# Patient Record
Sex: MALE | Race: WHITE | NOT HISPANIC OR LATINO | Employment: UNEMPLOYED | URBAN - METROPOLITAN AREA
[De-identification: names, ages, dates, MRNs, and addresses within clinical notes are randomized per-mention and may not be internally consistent; named-entity substitution may affect disease eponyms.]

---

## 2022-05-19 ENCOUNTER — APPOINTMENT (OUTPATIENT)
Dept: GENERAL RADIOLOGY | Facility: HOSPITAL | Age: 27
End: 2022-05-19

## 2022-05-19 ENCOUNTER — HOSPITAL ENCOUNTER (EMERGENCY)
Facility: HOSPITAL | Age: 27
Discharge: HOME | End: 2022-05-19
Attending: EMERGENCY MEDICINE | Admitting: EMERGENCY MEDICINE

## 2022-05-19 VITALS
SYSTOLIC BLOOD PRESSURE: 126 MMHG | HEIGHT: 72 IN | TEMPERATURE: 97.5 F | WEIGHT: 190 LBS | HEART RATE: 77 BPM | OXYGEN SATURATION: 95 % | BODY MASS INDEX: 25.73 KG/M2 | DIASTOLIC BLOOD PRESSURE: 69 MMHG | RESPIRATION RATE: 18 BRPM

## 2022-05-19 DIAGNOSIS — R07.89 LEFT-SIDED CHEST WALL PAIN: Primary | ICD-10-CM

## 2022-05-19 DIAGNOSIS — J02.0 ACUTE STREPTOCOCCAL PHARYNGITIS: ICD-10-CM

## 2022-05-19 LAB
ALBUMIN SERPL-MCNC: 4.6 G/DL (ref 3.5–5.2)
ALBUMIN/GLOB SERPL: 1.6 G/DL
ALP SERPL-CCNC: 66 U/L (ref 39–117)
ALT SERPL W P-5'-P-CCNC: 36 U/L (ref 1–41)
ANION GAP SERPL CALCULATED.3IONS-SCNC: 14.4 MMOL/L (ref 5–15)
AST SERPL-CCNC: 26 U/L (ref 1–40)
BASOPHILS # BLD AUTO: 0.06 10*3/MM3 (ref 0–0.2)
BASOPHILS NFR BLD AUTO: 0.4 % (ref 0–1.5)
BILIRUB SERPL-MCNC: 0.7 MG/DL (ref 0–1.2)
BUN SERPL-MCNC: 12 MG/DL (ref 6–20)
BUN/CREAT SERPL: 11.4 (ref 7–25)
CALCIUM SPEC-SCNC: 9.6 MG/DL (ref 8.6–10.5)
CHLORIDE SERPL-SCNC: 102 MMOL/L (ref 98–107)
CO2 SERPL-SCNC: 22.6 MMOL/L (ref 22–29)
CREAT SERPL-MCNC: 1.05 MG/DL (ref 0.76–1.27)
DEPRECATED RDW RBC AUTO: 41 FL (ref 37–54)
EGFRCR SERPLBLD CKD-EPI 2021: 101.7 ML/MIN/1.73
EOSINOPHIL # BLD AUTO: 0.21 10*3/MM3 (ref 0–0.4)
EOSINOPHIL NFR BLD AUTO: 1.5 % (ref 0.3–6.2)
ERYTHROCYTE [DISTWIDTH] IN BLOOD BY AUTOMATED COUNT: 12.9 % (ref 12.3–15.4)
GLOBULIN UR ELPH-MCNC: 2.8 GM/DL
GLUCOSE SERPL-MCNC: 89 MG/DL (ref 65–99)
HCT VFR BLD AUTO: 45.8 % (ref 37.5–51)
HGB BLD-MCNC: 15.7 G/DL (ref 13–17.7)
IMM GRANULOCYTES # BLD AUTO: 0.05 10*3/MM3 (ref 0–0.05)
IMM GRANULOCYTES NFR BLD AUTO: 0.4 % (ref 0–0.5)
LYMPHOCYTES # BLD AUTO: 3.02 10*3/MM3 (ref 0.7–3.1)
LYMPHOCYTES NFR BLD AUTO: 22 % (ref 19.6–45.3)
MCH RBC QN AUTO: 30.1 PG (ref 26.6–33)
MCHC RBC AUTO-ENTMCNC: 34.3 G/DL (ref 31.5–35.7)
MCV RBC AUTO: 87.9 FL (ref 79–97)
MONOCYTES # BLD AUTO: 1.19 10*3/MM3 (ref 0.1–0.9)
MONOCYTES NFR BLD AUTO: 8.7 % (ref 5–12)
NEUTROPHILS NFR BLD AUTO: 67 % (ref 42.7–76)
NEUTROPHILS NFR BLD AUTO: 9.2 10*3/MM3 (ref 1.7–7)
NRBC BLD AUTO-RTO: 0 /100 WBC (ref 0–0.2)
PLATELET # BLD AUTO: 253 10*3/MM3 (ref 140–450)
PMV BLD AUTO: 9.4 FL (ref 6–12)
POTASSIUM SERPL-SCNC: 4.2 MMOL/L (ref 3.5–5.2)
PROT SERPL-MCNC: 7.4 G/DL (ref 6–8.5)
RBC # BLD AUTO: 5.21 10*6/MM3 (ref 4.14–5.8)
S PYO AG THROAT QL: POSITIVE
SODIUM SERPL-SCNC: 139 MMOL/L (ref 136–145)
TROPONIN T SERPL-MCNC: <0.01 NG/ML (ref 0–0.03)
WBC NRBC COR # BLD: 13.73 10*3/MM3 (ref 3.4–10.8)

## 2022-05-19 PROCEDURE — 93010 ELECTROCARDIOGRAM REPORT: CPT | Performed by: INTERNAL MEDICINE

## 2022-05-19 PROCEDURE — 71045 X-RAY EXAM CHEST 1 VIEW: CPT

## 2022-05-19 PROCEDURE — 36415 COLL VENOUS BLD VENIPUNCTURE: CPT

## 2022-05-19 PROCEDURE — 80053 COMPREHEN METABOLIC PANEL: CPT | Performed by: PHYSICIAN ASSISTANT

## 2022-05-19 PROCEDURE — 87880 STREP A ASSAY W/OPTIC: CPT | Performed by: PHYSICIAN ASSISTANT

## 2022-05-19 PROCEDURE — 93005 ELECTROCARDIOGRAM TRACING: CPT

## 2022-05-19 PROCEDURE — 84484 ASSAY OF TROPONIN QUANT: CPT | Performed by: PHYSICIAN ASSISTANT

## 2022-05-19 PROCEDURE — 93005 ELECTROCARDIOGRAM TRACING: CPT | Performed by: EMERGENCY MEDICINE

## 2022-05-19 PROCEDURE — 99283 EMERGENCY DEPT VISIT LOW MDM: CPT

## 2022-05-19 PROCEDURE — 85025 COMPLETE CBC W/AUTO DIFF WBC: CPT | Performed by: PHYSICIAN ASSISTANT

## 2022-05-19 RX ORDER — AMOXICILLIN 875 MG/1
875 TABLET, COATED ORAL 2 TIMES DAILY
Qty: 20 TABLET | Refills: 0 | Status: SHIPPED | OUTPATIENT
Start: 2022-05-19 | End: 2022-05-29

## 2022-05-19 NOTE — ED PROVIDER NOTES
MD ATTESTATION NOTE    The MANASA and I have discussed this patient's history, physical exam, and treatment plan.  I have reviewed the documentation and personally had a face to face interaction with the patient. I affirm the documentation and agree with the treatment and plan.  The attached note describes my personal findings.      I provided a substantive portion of the care of the patient.  I personally performed the physical exam in its entirety, and below are my findings.  For this patient encounter, the patient wore surgical mask, I wore full protective PPE including N95 and eye protection    Brief HPI: 26-year-old male who presents to the ER for left-sided chest pain that radiates to his shoulder after starting regimen of push-ups and playing golf.  Patient states the pain is worse with movement and palpation.  The patient also complains of a sore throat this morning.  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    General : 26-year-old male patient is awake alert and oriented  HEENT: NCAT: The patient does have posterior oropharyngeal erythema and tonsillar exudates.  CV: Heart is regular with no murmurs: Left upper chest wall tenderness to palpation  Respiratory: CTA bilaterally  Abd: Soft and nontender  Ext: No acute abnormalities  Skin: No rash  Neuro: Cranial nerves II through XII grossly intact as tested.  No acute lateralizing deficits.  Psych: Normal mood and affect      Plan: Obtain EKG, labs, chest x-ray and strep swab    The patient EKG was unremarkable as was his troponin.  His chest x-ray is negative.  However his strep screen is positive.  I believe the patient is stable for discharge home on amoxicillin for his strep throat and can treat his chest wall pain symptomatically with anti-inflammatories.     Dominic Finley MD  05/19/22 1936

## 2022-05-19 NOTE — ED NOTES
Left sided CP with radiation to left shoulder that started last night. Denies cardiac hx. No nausea, vomiting or dizziness.

## 2022-05-19 NOTE — ED PROVIDER NOTES
EMERGENCY DEPARTMENT ENCOUNTER    Room Number:  35/35  Date seen:  5/19/2022  Time seen: 13:11 EDT  PCP: Provider, No Known  Historian: patient      HPI:  Chief Complaint: chest pain    A complete HPI/ROS/PMH/PSH/SH/FH are unobtainable due to: none    Context: Emiliano Moncada is a 26 y.o. male who presents to the ED for evaluation of 5 out of 10 left-sided chest tightness that radiates into his left shoulder as a tightness that began last night.  He does not remember what he was doing when it started but does not believe he was doing any significant activity.  Pain is constant since, worsens when he moves his left arm, it is not exertional or pleuritic.  No history of the symptoms.  He denies any significant medical history, no history of heart lung or kidney disease.  No family history of CAD.  Denies any cocaine use.  No intravenous drug use.  Denies any shortness of breath diaphoresis lightheadedness or passing out.  He also reports awaking with a sore throat this morning.  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  He denies any cough congestion fever chills or other upper respiratory symptoms.  His significant other's mother has had 2 heart attacks and was very concerned about his symptoms, prompted his ER visit.        PAST MEDICAL HISTORY  Active Ambulatory Problems     Diagnosis Date Noted   • No Active Ambulatory Problems     Resolved Ambulatory Problems     Diagnosis Date Noted   • No Resolved Ambulatory Problems     No Additional Past Medical History         PAST SURGICAL HISTORY  No past surgical history on file.      FAMILY HISTORY  Family History   Problem Relation Age of Onset   • No Known Problems Mother    • No Known Problems Father          SOCIAL HISTORY  Social History     Socioeconomic History   • Marital status: Single   Tobacco Use   • Smoking status: Never Smoker   • Smokeless tobacco: Never Used   Substance and Sexual Activity   •  Alcohol use: Not Currently         ALLERGIES  Patient has no known allergies.        REVIEW OF SYSTEMS  Review of Systems     All systems reviewed and negative except for those discussed in HPI.       PHYSICAL EXAM  ED Triage Vitals [05/19/22 1250]   Temp Heart Rate Resp BP SpO2   97.5 °F (36.4 °C) 110 16 -- 96 %      Temp src Heart Rate Source Patient Position BP Location FiO2 (%)   -- -- -- -- --         GENERAL: not distressed  HENT: atraumatic, normocephalic.  Oropharynx is moist, there is posterior oropharyngeal erythema and some bilateral tonsillar exudate is present.  No trismus, uvula is midline  EYES: no scleral icterus, PERRL  CV: regular rhythm, regular rate  RESPIRATORY: normal effort, CTA B, no chest wall tenderness  ABDOMEN: soft, nontender nondistended  MUSCULOSKELETAL: no deformity  NEURO: alert, moves all extremities, follows commands  SKIN: warm, dry    Vital signs and nursing notes reviewed.          LAB RESULTS  Recent Results (from the past 24 hour(s))   ECG 12 Lead    Collection Time: 05/19/22  1:04 PM   Result Value Ref Range    QT Interval 372 ms   Rapid Strep A Screen - Swab, Throat    Collection Time: 05/19/22  2:00 PM    Specimen: Throat; Swab   Result Value Ref Range    Strep A Ag Positive (A) Negative   Comprehensive Metabolic Panel    Collection Time: 05/19/22  2:02 PM    Specimen: Blood   Result Value Ref Range    Glucose 89 65 - 99 mg/dL    BUN 12 6 - 20 mg/dL    Creatinine 1.05 0.76 - 1.27 mg/dL    Sodium 139 136 - 145 mmol/L    Potassium 4.2 3.5 - 5.2 mmol/L    Chloride 102 98 - 107 mmol/L    CO2 22.6 22.0 - 29.0 mmol/L    Calcium 9.6 8.6 - 10.5 mg/dL    Total Protein 7.4 6.0 - 8.5 g/dL    Albumin 4.60 3.50 - 5.20 g/dL    ALT (SGPT) 36 1 - 41 U/L    AST (SGOT) 26 1 - 40 U/L    Alkaline Phosphatase 66 39 - 117 U/L    Total Bilirubin 0.7 0.0 - 1.2 mg/dL    Globulin 2.8 gm/dL    A/G Ratio 1.6 g/dL    BUN/Creatinine Ratio 11.4 7.0 - 25.0    Anion Gap 14.4 5.0 - 15.0 mmol/L    eGFR 101.7  >60.0 mL/min/1.73   Troponin    Collection Time: 05/19/22  2:02 PM    Specimen: Blood   Result Value Ref Range    Troponin T <0.010 0.000 - 0.030 ng/mL   CBC Auto Differential    Collection Time: 05/19/22  2:02 PM    Specimen: Blood   Result Value Ref Range    WBC 13.73 (H) 3.40 - 10.80 10*3/mm3    RBC 5.21 4.14 - 5.80 10*6/mm3    Hemoglobin 15.7 13.0 - 17.7 g/dL    Hematocrit 45.8 37.5 - 51.0 %    MCV 87.9 79.0 - 97.0 fL    MCH 30.1 26.6 - 33.0 pg    MCHC 34.3 31.5 - 35.7 g/dL    RDW 12.9 12.3 - 15.4 %    RDW-SD 41.0 37.0 - 54.0 fl    MPV 9.4 6.0 - 12.0 fL    Platelets 253 140 - 450 10*3/mm3    Neutrophil % 67.0 42.7 - 76.0 %    Lymphocyte % 22.0 19.6 - 45.3 %    Monocyte % 8.7 5.0 - 12.0 %    Eosinophil % 1.5 0.3 - 6.2 %    Basophil % 0.4 0.0 - 1.5 %    Immature Grans % 0.4 0.0 - 0.5 %    Neutrophils, Absolute 9.20 (H) 1.70 - 7.00 10*3/mm3    Lymphocytes, Absolute 3.02 0.70 - 3.10 10*3/mm3    Monocytes, Absolute 1.19 (H) 0.10 - 0.90 10*3/mm3    Eosinophils, Absolute 0.21 0.00 - 0.40 10*3/mm3    Basophils, Absolute 0.06 0.00 - 0.20 10*3/mm3    Immature Grans, Absolute 0.05 0.00 - 0.05 10*3/mm3    nRBC 0.0 0.0 - 0.2 /100 WBC       Ordered the above labs and independently reviewed the results.        RADIOLOGY  XR Chest 1 View   Final Result   No evidence for acute pulmonary process. Follow-up as   clinical indications persist.       This report was finalized on 5/19/2022 1:56 PM by Dr. Monster Sauer M.D.              I ordered the above noted radiological studies. Reviewed by me and discussed with radiologist.  See dictation for official radiology interpretation.    PROCEDURES  Procedures        MEDICATIONS GIVEN IN ER  Medications - No data to display          PROGRESS AND CONSULTS    DDx includes but is not limited to acute coronary syndrome, pulmonary embolism, thoracic aortic dissection, pneumonia, pneumothorax, musculoskeletal pain, GERD or esophageal spasm, anxiety, myocarditis/pericarditis, esophageal  rupture, pancreatitis.     History: 0  EC  Age: 0  Risk factors: 0    HEART score - 0        ED Course as of 22   Thu May 19, 2022   1437 WBC(!): 13.73 [KA]   1437 Hemoglobin: 15.7 [KA]   1459 Strep A Ag(!): Positive [KA]   1535 Glucose: 89 [KA]   1535 Creatinine: 1.05 [KA]   1535 Troponin T: <0.010 [KA]   1535 Reassessed the patient, he is resting comfortably.  We discussed all of his labs and imaging.  I suspect his elevated white blood cell count is secondary to acute strep infection.  He does tell me he recently started a push-up regimen, I suspect this is likely the cause of his chest wall pain.  Heart score 0.  I prescribed amoxicillin for the strep and he can follow-up with his PCP next week.  Have given him information for the patient connection.  He is agreeable with this plan and stable for discharge. [KA]   1536 My interpretation of the chest x-ray is no acute infiltrate [KA]      ED Course User Index  [KA] Torie Chen PA             Patient was placed in face mask in first look. Patient was wearing facemask each time I entered the room and throughout our encounter. I wore protective equipment throughout this patient encounter including a face mask, eye shield and gloves. Hand hygiene was performed before donning protective equipment and after removal when leaving the room.        DIAGNOSIS  Final diagnoses:   Left-sided chest wall pain   Acute streptococcal pharyngitis         Follow Up:  PATIENT CONNECTION - [facility] [address]  [phone]  Schedule an appointment as soon as possible for a visit in 1 week        RX:     Medication List      New Prescriptions    amoxicillin 875 MG tablet  Commonly known as: AMOXIL  Take 1 tablet by mouth 2 (Two) Times a Day for 10 days.           Where to Get Your Medications      These medications were sent to [facility] - [place] [id number]    Hours: 7:00 AM-6:00 PM Mon-Fri, 8:00 AM-4:30 PM  Sat-Sun (Closed 12-12:30PM) Phone: [phone]   · amoxicillin 875 MG tablet           Latest Documented Vital Signs:  As of 20:10 EDT  BP- 126/69 HR- 77 Temp- 97.5 °F (36.4 °C) O2 sat- 95%       Torie Chen PA  05/19/22 2010

## 2022-05-20 LAB — QT INTERVAL: 372 MS
